# Patient Record
Sex: MALE | Race: WHITE | NOT HISPANIC OR LATINO
[De-identification: names, ages, dates, MRNs, and addresses within clinical notes are randomized per-mention and may not be internally consistent; named-entity substitution may affect disease eponyms.]

---

## 2017-04-14 ENCOUNTER — TRANSCRIPTION ENCOUNTER (OUTPATIENT)
Age: 52
End: 2017-04-14

## 2017-04-17 ENCOUNTER — APPOINTMENT (OUTPATIENT)
Dept: ORTHOPEDIC SURGERY | Facility: CLINIC | Age: 52
End: 2017-04-17

## 2017-04-17 VITALS — WEIGHT: 205 LBS | HEIGHT: 73 IN | BODY MASS INDEX: 27.17 KG/M2

## 2017-04-17 DIAGNOSIS — Z82.61 FAMILY HISTORY OF ARTHRITIS: ICD-10-CM

## 2017-04-17 DIAGNOSIS — Z78.9 OTHER SPECIFIED HEALTH STATUS: ICD-10-CM

## 2017-04-17 DIAGNOSIS — Z80.3 FAMILY HISTORY OF MALIGNANT NEOPLASM OF BREAST: ICD-10-CM

## 2017-04-17 PROBLEM — Z00.00 ENCOUNTER FOR PREVENTIVE HEALTH EXAMINATION: Status: ACTIVE | Noted: 2017-04-17

## 2017-05-24 ENCOUNTER — APPOINTMENT (OUTPATIENT)
Dept: ORTHOPEDIC SURGERY | Facility: CLINIC | Age: 52
End: 2017-05-24

## 2017-05-24 DIAGNOSIS — S82.831A OTHER FRACTURE OF UPPER AND LOWER END OF RIGHT FIBULA, INITIAL ENCOUNTER FOR CLOSED FRACTURE: ICD-10-CM

## 2017-08-30 ENCOUNTER — APPOINTMENT (OUTPATIENT)
Dept: VASCULAR SURGERY | Facility: CLINIC | Age: 52
End: 2017-08-30
Payer: COMMERCIAL

## 2017-08-30 VITALS — HEIGHT: 73 IN | WEIGHT: 200 LBS | BODY MASS INDEX: 26.51 KG/M2

## 2017-08-30 DIAGNOSIS — S91.009A UNSPECIFIED OPEN WOUND, UNSPECIFIED ANKLE, INITIAL ENCOUNTER: ICD-10-CM

## 2017-08-30 DIAGNOSIS — Z80.3 FAMILY HISTORY OF MALIGNANT NEOPLASM OF BREAST: ICD-10-CM

## 2017-08-30 DIAGNOSIS — Z80.42 FAMILY HISTORY OF MALIGNANT NEOPLASM OF PROSTATE: ICD-10-CM

## 2017-08-30 DIAGNOSIS — I83.93 ASYMPTOMATIC VARICOSE VEINS OF BILATERAL LOWER EXTREMITIES: ICD-10-CM

## 2017-08-30 PROCEDURE — 29580 STRAPPING UNNA BOOT: CPT

## 2017-08-30 PROCEDURE — 99214 OFFICE O/P EST MOD 30 MIN: CPT | Mod: 25

## 2018-04-15 ENCOUNTER — TRANSCRIPTION ENCOUNTER (OUTPATIENT)
Age: 53
End: 2018-04-15

## 2018-07-28 PROBLEM — Z78.9 ALCOHOL USE: Status: ACTIVE | Noted: 2017-04-17

## 2018-08-06 ENCOUNTER — APPOINTMENT (OUTPATIENT)
Dept: VASCULAR SURGERY | Facility: CLINIC | Age: 53
End: 2018-08-06
Payer: COMMERCIAL

## 2018-08-06 DIAGNOSIS — I87.2 VENOUS INSUFFICIENCY (CHRONIC) (PERIPHERAL): ICD-10-CM

## 2018-08-06 PROCEDURE — 99213 OFFICE O/P EST LOW 20 MIN: CPT

## 2020-04-26 ENCOUNTER — MESSAGE (OUTPATIENT)
Age: 55
End: 2020-04-26

## 2023-02-01 ENCOUNTER — APPOINTMENT (OUTPATIENT)
Dept: VASCULAR SURGERY | Facility: CLINIC | Age: 58
End: 2023-02-01

## 2023-02-22 ENCOUNTER — APPOINTMENT (OUTPATIENT)
Dept: VASCULAR SURGERY | Facility: CLINIC | Age: 58
End: 2023-02-22

## 2023-03-08 ENCOUNTER — APPOINTMENT (OUTPATIENT)
Dept: VASCULAR SURGERY | Facility: CLINIC | Age: 58
End: 2023-03-08
Payer: COMMERCIAL

## 2023-03-08 ENCOUNTER — NON-APPOINTMENT (OUTPATIENT)
Age: 58
End: 2023-03-08

## 2023-03-08 VITALS — HEART RATE: 99 BPM | SYSTOLIC BLOOD PRESSURE: 118 MMHG | DIASTOLIC BLOOD PRESSURE: 78 MMHG

## 2023-03-08 PROCEDURE — 11042 DBRDMT SUBQ TIS 1ST 20SQCM/<: CPT

## 2023-03-08 PROCEDURE — 93971 EXTREMITY STUDY: CPT

## 2023-03-08 PROCEDURE — 99204 OFFICE O/P NEW MOD 45 MIN: CPT | Mod: 25

## 2023-03-08 RX ORDER — SILVER SULFADIAZINE 10 MG/G
1 CREAM TOPICAL TWICE DAILY
Qty: 30 | Refills: 5 | Status: ACTIVE | COMMUNITY
Start: 2023-03-08 | End: 1900-01-01

## 2023-03-08 RX ORDER — SILVER SULFADIAZINE 10 MG/G
1 CREAM TOPICAL TWICE DAILY
Qty: 60 | Refills: 5 | Status: DISCONTINUED | COMMUNITY
Start: 2018-08-06 | End: 2023-03-08

## 2023-03-08 RX ORDER — CLOBETASOL PROPIONATE 0.5 MG/G
0.05 CREAM TOPICAL 3 TIMES DAILY
Qty: 30 | Refills: 5 | Status: ACTIVE | COMMUNITY
Start: 2023-03-08 | End: 1900-01-01

## 2023-03-08 NOTE — PROCEDURE
[FreeTextEntry1] : A sharp excisional debridement of devitalized tissue was performed on the left medial malleolus ulcer. THe debridement was performed to granulating tissue. The patient tolerated the procedure.

## 2023-03-08 NOTE — HISTORY OF PRESENT ILLNESS
[FreeTextEntry1] : 52 yo male presents to the office with a chief complaint of a nonhealing ulcer to his left medial malleolus for the last 6 months. He has a history of varicose veins. He is s/p sclerotherapy to his left ankle and multiple ablation procedures. He denies a history of DVT. He has a history of SVT. He wears compression stockings daily.  [de-identified] : 56 yo male previously seen in 2018 returns with a chief complaint of a left lower extremity recurrent ulceration. He  reports that he developed an ulcer approximately 2 months ago. He reports that he has been applying Silvadene to the wound and wearing a compression stocking daily. He has a history of multiple venous procedures. He reports significant pain associated with the ulcer. He underwent a venous duplex and is here to discuss the results and additional treatment options.

## 2023-03-08 NOTE — REASON FOR VISIT
[Initial Evaluation] : an initial evaluation [FreeTextEntry1] : recurrent venous ulceration left ankle

## 2023-03-08 NOTE — ASSESSMENT
[FreeTextEntry1] : 56 yo male with a recurrent nonhealing ulceration of the left medial malleolus. He underwent a venous duplex which demonstrated an ablated GSV. He has DVI. He underwent a sharp excisional debridement of devitalized tissue with a 15 blade (<20 sq cm was debrided). He was instructed to apply Silvadene to the wound and Clobetasol to the surrounding area. He will wear a compression stocking daily. He will follow up in 3 weeks. \par \par \par

## 2023-03-08 NOTE — PHYSICAL EXAM
[Normal Breath Sounds] : Normal breath sounds [Normal Rate and Rhythm] : normal rate and rhythm [2+] : left 2+ [Ankle Swelling (On Exam)] : not present [Varicose Veins Of Lower Extremities] : bilaterally [] : of the left leg [Ankle Swelling On The Left] : moderate [Skin Ulcer] : ulcer [Alert] : alert [Oriented to Person] : oriented to person [Oriented to Place] : oriented to place [Oriented to Time] : oriented to time [de-identified] : Awake and Alert [de-identified] : medial malleolar ulceration with fibrinous exudate [de-identified] : No gross motor or sensory deficits   [de-identified] : appropriate

## 2023-03-08 NOTE — REVIEW OF SYSTEMS
[Fever] : no fever [Chills] : no chills [Leg Claudication] : no intermittent leg claudication [Lower Ext Edema] : no extremity edema [Limb Pain] : limb pain [Limb Swelling] : limb swelling [Skin Wound] : skin wound [Limb Weakness] : no limb weakness [Difficulty Walking] : no difficulty walking

## 2023-03-22 ENCOUNTER — APPOINTMENT (OUTPATIENT)
Dept: VASCULAR SURGERY | Facility: CLINIC | Age: 58
End: 2023-03-22
Payer: COMMERCIAL

## 2023-03-22 VITALS — HEART RATE: 90 BPM | SYSTOLIC BLOOD PRESSURE: 119 MMHG | DIASTOLIC BLOOD PRESSURE: 77 MMHG

## 2023-03-22 DIAGNOSIS — I83.029 VARICOSE VEINS OF LEFT LOWER EXTREMITY WITH ULCER OF UNSPECIFIED SITE: ICD-10-CM

## 2023-03-22 DIAGNOSIS — L97.929 VARICOSE VEINS OF LEFT LOWER EXTREMITY WITH ULCER OF UNSPECIFIED SITE: ICD-10-CM

## 2023-03-22 PROCEDURE — 11042 DBRDMT SUBQ TIS 1ST 20SQCM/<: CPT

## 2023-03-22 NOTE — PROCEDURE
[FreeTextEntry1] : Topical lidocaine was applied. A sharp excisional debridement of devitalized tissue was performed on the left medial malleolus ulcer with a 15 blade. The debridement was performed to granulating tissue. The patient tolerated the procedure.

## 2023-03-22 NOTE — REVIEW OF SYSTEMS
[Limb Pain] : limb pain [Skin Wound] : skin wound [Fever] : no fever [Chills] : no chills [Leg Claudication] : no intermittent leg claudication [Lower Ext Edema] : no extremity edema [Limb Swelling] : no limb swelling [Limb Weakness] : no limb weakness [Difficulty Walking] : no difficulty walking

## 2023-03-22 NOTE — ASSESSMENT
[FreeTextEntry1] : 58 yo male with a recurrent nonhealing ulceration of the left medial malleolus and deep venous insufficiency.  He underwent a sharp excisional debridement of devitalized tissue with a 15 blade (<20 sq cm was debrided). He was instructed to  apply Santyl to the wound and Clobetasol to the surrounding area daily. Continue compression daily. He will follow up in few weeks to assess the wound. \par \par \par

## 2023-03-22 NOTE — PHYSICAL EXAM
[Normal Breath Sounds] : Normal breath sounds [Normal Rate and Rhythm] : normal rate and rhythm [2+] : left 2+ [Varicose Veins Of Lower Extremities] : bilaterally [] : of the left leg [Ankle Swelling On The Left] : moderate [Skin Ulcer] : ulcer [Alert] : alert [Oriented to Person] : oriented to person [Oriented to Place] : oriented to place [Oriented to Time] : oriented to time [Ankle Swelling (On Exam)] : not present [de-identified] : Awake and Alert [de-identified] : medial malleolar ulceration with fibrinous exudate [de-identified] : No gross motor or sensory deficits   [de-identified] : appropriate

## 2023-03-22 NOTE — HISTORY OF PRESENT ILLNESS
[FreeTextEntry1] : 52 yo male presents to the office with a chief complaint of a nonhealing ulcer to his left medial malleolus for the last 6 months. He has a history of varicose veins. He is s/p sclerotherapy to his left ankle and multiple ablation procedures. He denies a history of DVT. He has a history of SVT. He wears compression stockings daily.  [de-identified] : 58 yo male returns in follow up for  a chief complaint of a left lower extremity recurrent ulceration. He  has been applying Silvadene to the wound and wearing a compression stocking daily with little improvement. He continues to report significant pain associated with the ulcer but decreased itching sensation since he started using Clobetasol ointment. The patient reports that he currently works 3 jobs so he stands for prolonged periods of time during the day and night.

## 2023-04-05 ENCOUNTER — APPOINTMENT (OUTPATIENT)
Dept: VASCULAR SURGERY | Facility: CLINIC | Age: 58
End: 2023-04-05

## 2023-05-31 ENCOUNTER — APPOINTMENT (OUTPATIENT)
Dept: VASCULAR SURGERY | Facility: CLINIC | Age: 58
End: 2023-05-31

## 2023-09-06 ENCOUNTER — APPOINTMENT (OUTPATIENT)
Dept: VASCULAR SURGERY | Facility: CLINIC | Age: 58
End: 2023-09-06

## 2023-10-04 ENCOUNTER — APPOINTMENT (OUTPATIENT)
Dept: VASCULAR SURGERY | Facility: CLINIC | Age: 58
End: 2023-10-04

## 2024-12-25 PROBLEM — F10.90 ALCOHOL USE: Status: ACTIVE | Noted: 2017-04-17

## 2025-01-08 ENCOUNTER — NON-APPOINTMENT (OUTPATIENT)
Age: 60
End: 2025-01-08

## 2025-01-08 ENCOUNTER — APPOINTMENT (OUTPATIENT)
Dept: GASTROENTEROLOGY | Facility: CLINIC | Age: 60
End: 2025-01-08
Payer: COMMERCIAL

## 2025-01-08 VITALS
HEIGHT: 72 IN | DIASTOLIC BLOOD PRESSURE: 62 MMHG | WEIGHT: 198 LBS | SYSTOLIC BLOOD PRESSURE: 108 MMHG | OXYGEN SATURATION: 99 % | BODY MASS INDEX: 26.82 KG/M2 | HEART RATE: 90 BPM

## 2025-01-08 DIAGNOSIS — Z12.11 ENCOUNTER FOR SCREENING FOR MALIGNANT NEOPLASM OF COLON: ICD-10-CM

## 2025-01-08 PROCEDURE — 99202 OFFICE O/P NEW SF 15 MIN: CPT

## 2025-01-08 RX ORDER — SODIUM SULFATE, POTASSIUM SULFATE AND MAGNESIUM SULFATE 1.6; 3.13; 17.5 G/177ML; G/177ML; G/177ML
17.5-3.13-1.6 SOLUTION ORAL
Qty: 2 | Refills: 0 | Status: ACTIVE | COMMUNITY
Start: 2025-01-08 | End: 1900-01-01

## 2025-01-08 RX ORDER — CALCIUM CARBONATE/VITAMIN D3 600 MG-10
TABLET ORAL
Refills: 0 | Status: ACTIVE | COMMUNITY

## 2025-03-11 ENCOUNTER — TRANSCRIPTION ENCOUNTER (OUTPATIENT)
Age: 60
End: 2025-03-11

## 2025-04-30 ENCOUNTER — APPOINTMENT (OUTPATIENT)
Dept: GASTROENTEROLOGY | Facility: HOSPITAL | Age: 60
End: 2025-04-30